# Patient Record
Sex: MALE | Race: WHITE | Employment: FULL TIME | URBAN - METROPOLITAN AREA
[De-identification: names, ages, dates, MRNs, and addresses within clinical notes are randomized per-mention and may not be internally consistent; named-entity substitution may affect disease eponyms.]

---

## 2017-05-15 ENCOUNTER — HOSPITAL ENCOUNTER (EMERGENCY)
Age: 27
Discharge: HOME | End: 2017-05-15
Attending: EMERGENCY MEDICINE
Payer: SELF-PAY

## 2017-05-15 VITALS
TEMPERATURE: 100.8 F | SYSTOLIC BLOOD PRESSURE: 145 MMHG | DIASTOLIC BLOOD PRESSURE: 90 MMHG | HEIGHT: 64 IN | RESPIRATION RATE: 19 BRPM | HEART RATE: 81 BPM | BODY MASS INDEX: 26.46 KG/M2 | WEIGHT: 155 LBS

## 2017-05-15 DIAGNOSIS — K08.89 PAIN, DENTAL: ICD-10-CM

## 2017-05-15 DIAGNOSIS — K02.9 TOOTH DECAY: Primary | ICD-10-CM

## 2017-05-15 PROCEDURE — 74011250637 HC RX REV CODE- 250/637: Performed by: PHYSICIAN ASSISTANT

## 2017-05-15 PROCEDURE — 99283 EMERGENCY DEPT VISIT LOW MDM: CPT

## 2017-05-15 RX ORDER — TRAMADOL HYDROCHLORIDE 50 MG/1
50 TABLET ORAL
Qty: 20 TAB | Refills: 0 | Status: SHIPPED | OUTPATIENT
Start: 2017-05-15 | End: 2017-08-15

## 2017-05-15 RX ORDER — IBUPROFEN 600 MG/1
600 TABLET ORAL
Status: COMPLETED | OUTPATIENT
Start: 2017-05-15 | End: 2017-05-15

## 2017-05-15 RX ORDER — PENICILLIN V POTASSIUM 250 MG/1
500 TABLET, FILM COATED ORAL
Status: COMPLETED | OUTPATIENT
Start: 2017-05-15 | End: 2017-05-15

## 2017-05-15 RX ORDER — PENICILLIN V POTASSIUM 500 MG/1
500 TABLET, FILM COATED ORAL 3 TIMES DAILY
Qty: 30 TAB | Refills: 0 | Status: SHIPPED | OUTPATIENT
Start: 2017-05-15 | End: 2017-05-25

## 2017-05-15 RX ORDER — NAPROXEN 375 MG/1
375 TABLET ORAL 2 TIMES DAILY WITH MEALS
Qty: 20 TAB | Refills: 0 | Status: SHIPPED | OUTPATIENT
Start: 2017-05-15 | End: 2017-09-25

## 2017-05-15 RX ADMIN — PENICILLIN V POTASIUM 500 MG: 250 TABLET ORAL at 20:49

## 2017-05-15 RX ADMIN — IBUPROFEN 600 MG: 600 TABLET, FILM COATED ORAL at 20:49

## 2017-05-16 NOTE — ED PROVIDER NOTES
HPI Comments: 27yoM to ED c/o right lower dental pain x 1 week. Taking advil and using ambesol w/o relief. States tooth cracked around the filling. Pain going into his ear. No fever at home. Denies dizziness, N/V or any other complaints. Patient is a 24 y.o. male presenting with dental problem. The history is provided by the patient. Dental Pain             No past medical history on file. Past Surgical History:   Procedure Laterality Date    HX WRIST FRACTURE TX           No family history on file. Social History     Social History    Marital status: SINGLE     Spouse name: N/A    Number of children: N/A    Years of education: N/A     Occupational History    Not on file. Social History Main Topics    Smoking status: Former Smoker    Smokeless tobacco: Current User      Comment: chews tobacco     Alcohol use Yes      Comment: occassionally    Drug use: Yes     Special: Marijuana    Sexual activity: Not Currently     Partners: Female     Birth control/ protection: Condom     Other Topics Concern    Not on file     Social History Narrative         ALLERGIES: Review of patient's allergies indicates no known allergies. Review of Systems   Constitutional: Negative for chills. HENT: Positive for dental problem and ear pain. Neurological: Positive for headaches. All other systems reviewed and are negative. Vitals:    05/15/17 2025   BP: 145/90   Pulse: 81   Resp: 19   Temp: (!) 100.8 °F (38.2 °C)   Weight: 70.3 kg (155 lb)   Height: 5' 4\" (1.626 m)            Physical Exam   Constitutional: He appears well-developed and well-nourished. No distress. HENT:   Head: Normocephalic and atraumatic. Right Ear: Hearing, tympanic membrane, external ear and ear canal normal.   Left Ear: Hearing, tympanic membrane, external ear and ear canal normal.   Nose: Nose normal.   Mouth/Throat: Uvula is midline. No oropharyngeal exudate, posterior oropharyngeal erythema or tonsillar abscesses. Eyes: Conjunctivae and EOM are normal. Pupils are equal, round, and reactive to light. Neck: Normal range of motion. Neck supple. Musculoskeletal: Normal range of motion. Lymphadenopathy:     He has no cervical adenopathy. Neurological: He is alert. Skin: Skin is warm and dry. He is not diaphoretic. Psychiatric: He has a normal mood and affect. MDM  Number of Diagnoses or Management Options  Pain, dental:   Tooth decay:   Diagnosis management comments: Pt with dental pain, decay to right lower molar around the filling. Tongue ring noted and pt advised to remove. Fever noted as well, perhaps from dental infection as pt has no other complaints. Will start on pcn, pain meds. F/u with dental clinic. Return here if worse.  NICOLLE Argueta 8:33 PM      Risk of Complications, Morbidity, and/or Mortality  Presenting problems: low  Diagnostic procedures: minimal  Management options: low    Patient Progress  Patient progress: improved    ED Course       Procedures

## 2017-05-16 NOTE — DISCHARGE INSTRUCTIONS
Tooth Decay: Care Instructions  Your Care Instructions    Tooth decay is damage to a tooth caused by plaque. Plaque is a thin film of bacteria that sticks to the teeth above and below the gum line. If plaque isn't removed from the teeth, it can build up and harden into tartar. The bacteria in plaque and tartar use sugars in food to make acids. These acids can cause tooth decay and gum disease. Any part of your tooth can decay, from the roots below the gum line to the chewing surface. Decay can affect the outer layer (enamel) or inner layer (dentin) of your teeth. The deeper the decay, the worse the damage. Untreated tooth decay will get worse and may lead to tooth loss. If you have a small hole (cavity) in your tooth, your dentist can repair it by removing the decay and filling the hole. If you have deeper decay, you may need more treatment. A very badly damaged tooth may have to be removed. Follow-up care is a key part of your treatment and safety. Be sure to make and go to all appointments, and call your dentist if you are having problems. It's also a good idea to know your test results and keep a list of the medicines you take. How can you care for yourself at home? If you have pain:  · Take an over-the-counter pain medicine, such as acetaminophen (Tylenol), ibuprofen (Advil, Motrin), or naproxen (Aleve). Be safe with medicines. Read and follow all instructions on the label. ¨ Do not take two or more pain medicines at the same time unless the doctor told you to. Many pain medicines have acetaminophen, which is Tylenol. Too much acetaminophen (Tylenol) can be harmful. · Put ice or a cold pack on your cheek over the tooth for 10 to 15 minutes at a time. Put a thin cloth between the ice and your skin. To prevent tooth decay  · Brush teeth twice a day, and floss once a day. Brushing with fluoride toothpaste and flossing may be enough to reverse early decay.   · Use a toothbrush with soft, rounded-end bristles and a head that is small enough to reach all parts of your teeth and mouth. Replace your toothbrush every 3 or 4 months. You may also use an electric toothbrush that has rotating and oscillating (back-and-forth) action. · Ask your dentist about having fluoride treatments at the dental office. · Brush your tongue to help get rid of bacteria. · Eat healthy foods that include whole grains, vegetables, and fruits. · Have your teeth cleaned by a professional at least two times a year. · Do not smoke or use smokeless tobacco. Tobacco can make tooth decay worse. When should you call for help? Call your dentist now or seek immediate medical care if:  · You have signs of infection, such as:  ¨ Increased pain, swelling, warmth, or redness. ¨ Red streaks on the gum leading from a tooth. ¨ Pus draining from the gum around a tooth. ¨ A fever. · You have a toothache. Watch closely for changes in your health, and be sure to contact your dentist if you have any problems. Where can you learn more? Go to http://justine-sahil.info/. Enter B215 in the search box to learn more about \"Tooth Decay: Care Instructions. \"  Current as of: August 9, 2016  Content Version: 11.2  © 1701-2669 [employer]. Care instructions adapted under license by [employer] (which disclaims liability or warranty for this information). If you have questions about a medical condition or this instruction, always ask your healthcare professional. [place] [address] any warranty or liability for your use of this information.

## 2017-08-15 ENCOUNTER — HOSPITAL ENCOUNTER (EMERGENCY)
Age: 27
Discharge: HOME | End: 2017-08-15
Attending: EMERGENCY MEDICINE
Payer: SELF-PAY

## 2017-08-15 VITALS
SYSTOLIC BLOOD PRESSURE: 118 MMHG | RESPIRATION RATE: 16 BRPM | HEART RATE: 65 BPM | OXYGEN SATURATION: 99 % | TEMPERATURE: 98.2 F | WEIGHT: 155 LBS | HEIGHT: 64 IN | BODY MASS INDEX: 26.46 KG/M2 | DIASTOLIC BLOOD PRESSURE: 80 MMHG

## 2017-08-15 DIAGNOSIS — K08.89 DENTALGIA: Primary | ICD-10-CM

## 2017-08-15 PROCEDURE — 99282 EMERGENCY DEPT VISIT SF MDM: CPT

## 2017-08-15 RX ORDER — PENICILLIN V POTASSIUM 500 MG/1
500 TABLET, FILM COATED ORAL 2 TIMES DAILY
Qty: 14 TAB | Refills: 0 | Status: SHIPPED | OUTPATIENT
Start: 2017-08-15 | End: 2017-08-22

## 2017-08-15 RX ORDER — TRAMADOL HYDROCHLORIDE 50 MG/1
50 TABLET ORAL
Qty: 15 TAB | Refills: 0 | Status: SHIPPED | OUTPATIENT
Start: 2017-08-15 | End: 2017-09-25

## 2017-08-16 NOTE — ED NOTES
Pt complains of lower right dental pain for the past 3 days, worsening today, upon inspection patient has a broken tooth in the bottom right side.  Patient admits that he chew tobacco

## 2017-08-16 NOTE — ED PROVIDER NOTES
HPI Comments: 24yo M c/o dental pain. He broke his tooth one week ago and pain has been worsening since then. The tooth is painful to touch and sensitive to temperature. He denies fevers, throat swelling, trouble swallowing. He feels like his cheek is swollen. He has an appt with a dentist next week. Patient is a 24 y.o. male presenting with dental problem. Dental Pain             History reviewed. No pertinent past medical history. Past Surgical History:   Procedure Laterality Date    HX WRIST FRACTURE TX           History reviewed. No pertinent family history. Social History     Social History    Marital status: SINGLE     Spouse name: N/A    Number of children: N/A    Years of education: N/A     Occupational History    Not on file. Social History Main Topics    Smoking status: Former Smoker    Smokeless tobacco: Current User      Comment: chews tobacco     Alcohol use Yes      Comment: occassionally    Drug use: Yes     Special: Marijuana    Sexual activity: Not Currently     Partners: Female     Birth control/ protection: Condom     Other Topics Concern    Not on file     Social History Narrative         ALLERGIES: Review of patient's allergies indicates no known allergies. Review of Systems   Constitutional: Negative for fever. HENT: Positive for dental problem and facial swelling. Eyes: Negative for visual disturbance. Respiratory: Negative for shortness of breath. Cardiovascular: Negative for chest pain. Gastrointestinal: Negative for abdominal pain. Genitourinary: Negative for dysuria. Musculoskeletal: Negative for neck pain. Skin: Negative for rash. Neurological: Negative for dizziness. Psychiatric/Behavioral: Negative for confusion. All other systems reviewed and are negative.       Vitals:    08/15/17 2223   BP: 118/80   Pulse: 65   Resp: 16   Temp: 98.2 °F (36.8 °C)   SpO2: 99%   Weight: 70.3 kg (155 lb)   Height: 5' 4\" (1.626 m) Physical Exam   Constitutional: He appears well-developed and well-nourished. No distress. HENT:   Head: Normocephalic and atraumatic. Broken decayed tooth bottom right. Multiple decayed teeth throughout. No gingival swelling or palpable fluctuance to suggest abscess. Airway patent without edema. Eyes: Conjunctivae are normal.   Neck: Normal range of motion. Neck supple. Cardiovascular: Normal rate, regular rhythm and normal heart sounds. Pulmonary/Chest: Effort normal and breath sounds normal.   Abdominal: Soft. Musculoskeletal: Normal range of motion. Neurological: He is alert. Skin: Skin is warm and dry. He is not diaphoretic. Psychiatric: He has a normal mood and affect. Nursing note and vitals reviewed. MDM  Number of Diagnoses or Management Options  Dentalgia: new and does not require workup  Diagnosis management comments: Dental pain with visible decay. No visible abscess. Cover with abx refer to dentist.   Discussed treatment plan, return precautions, symptomatic relief, and expected time to improvement. All questions answered. Patient is stable for discharge and outpatient management. ED Course       Procedures    Diagnosis:   1. Dentalgia          Disposition: home    Follow-up Information     Follow up With Details Comments [address] Schedule an appointment as soon as possible for a visit  Maykel Estevez [phone] [facility]  Immediately if symptoms worsen [address]  [phone]          Patient's Medications   Start Taking    PENICILLIN V POTASSIUM (VEETID) 500 MG TABLET    Take 1 Tab by mouth two (2) times a day for 7 days. TRAMADOL (ULTRAM) 50 MG TABLET    Take 1 Tab by mouth every six (6) hours as needed for Pain. Max Daily Amount: 200 mg. Continue Taking    NAPROXEN (NAPROSYN) 375 MG TABLET    Take 1 Tab by mouth two (2) times daily (with meals). These Medications have changed    No medications on file   Stop Taking    TRAMADOL (ULTRAM) 50 MG TABLET    Take 1 Tab by mouth every six (6) hours as needed for Pain. Max Daily Amount: 200 mg.      Percy Mancia PA-C

## 2017-08-16 NOTE — DISCHARGE INSTRUCTIONS
Complete entire course of antibiotics. Take NSAIDs such as tylenol or ibuprofin as directed for pain control. Do not take on an empty stomach. Narcotics cannot be taken while driving. Return to ER for severe pain, throat swelling, difficulty breathing, or high fevers. Follow up with dentist immediately. Tooth and Gum Pain: Care Instructions  Your Care Instructions    The most common causes of dental pain are tooth decay and gum disease. Pain can also be caused by an infection of the tooth (abscess) or the gums. Or you may have pain from a broken or cracked tooth. Other causes of pain include infection and damage to a tooth from nervous grinding of your teeth. A wisdom tooth can be painful when it is coming in but cannot break through the gum. It can also be painful when the tooth is only partway in and extra gum tissue has formed around it. The tissue can get inflamed (pericoronitis), and sometimes it gets infected. Prompt dental care can help find the cause of your toothache and keep the tooth from dying or gum disease from getting worse. Self-care at home may reduce your pain and discomfort. Follow-up care is a key part of your treatment and safety. Be sure to make and go to all appointments, and call your dentist or doctor if you are having problems. It's also a good idea to know your test results and keep a list of the medicines you take. How can you care for yourself at home? · To reduce pain and facial swelling, put an ice or cold pack on the outside of your cheek for 10 to 20 minutes at a time. Put a thin cloth between the ice and your skin. Do not use heat. · If your doctor prescribed antibiotics, take them as directed. Do not stop taking them just because you feel better. You need to take the full course of antibiotics. · Ask your doctor if you can take an over-the-counter pain medicine, such as acetaminophen (Tylenol), ibuprofen (Advil, Motrin), or naproxen (Aleve).  Be safe with medicines. Read and follow all instructions on the label. · Avoid very hot, cold, or sweet foods and drinks if they increase your pain. · Rinse your mouth with warm salt water every 2 hours to help relieve pain and swelling. Mix 1 teaspoon of salt in 8 ounces of water. · Talk to your dentist about using special toothpaste for sensitive teeth. To reduce pain on contact with heat or cold or when brushing, brush with this toothpaste regularly or rub a small amount of the paste on the sensitive area with a clean finger 2 or 3 times a day. Floss gently between your teeth. · Do not smoke or use spit tobacco. Tobacco use can make gum problems worse, decreases your ability to fight infection in your gums, and delays healing. If you need help quitting, talk to your doctor about stop-smoking programs and medicines. These can increase your chances of quitting for good. When should you call for help? Call 911 anytime you think you may need emergency care. For example, call if:  · You have trouble breathing. Call your dentist or doctor now or seek immediate medical care if:  · You have signs of infection, such as:  ¨ Increased pain, swelling, warmth, or redness. ¨ Red streaks leading from the area. ¨ Pus draining from the area. ¨ A fever. Watch closely for changes in your health, and be sure to contact your doctor if:  · You do not get better as expected. Where can you learn more? Go to http://justine-sahil.info/. Enter [id number] [id number] in the search box to learn more about \"Tooth and Gum Pain: Care Instructions. \"  Current as of: August 11, 2016  Content Version: 11.3  © 0301-4301 [employer]. Care instructions adapted under license by [employer] (which disclaims liability or warranty for this information).  If you have questions about a medical condition or this instruction, always ask your healthcare professional. [address]  any warranty or liability for your use of this information.

## 2017-09-25 ENCOUNTER — HOSPITAL ENCOUNTER (EMERGENCY)
Age: 27
Discharge: HOME | End: 2017-09-25
Attending: EMERGENCY MEDICINE
Payer: SELF-PAY

## 2017-09-25 VITALS
WEIGHT: 140 LBS | DIASTOLIC BLOOD PRESSURE: 75 MMHG | TEMPERATURE: 98.1 F | SYSTOLIC BLOOD PRESSURE: 117 MMHG | RESPIRATION RATE: 17 BRPM | HEART RATE: 58 BPM | OXYGEN SATURATION: 98 % | BODY MASS INDEX: 24.03 KG/M2

## 2017-09-25 DIAGNOSIS — K02.9 DENTAL DECAY: ICD-10-CM

## 2017-09-25 DIAGNOSIS — K08.89 PAIN, DENTAL: Primary | ICD-10-CM

## 2017-09-25 PROCEDURE — 99282 EMERGENCY DEPT VISIT SF MDM: CPT

## 2017-09-25 RX ORDER — NAPROXEN 375 MG/1
375 TABLET ORAL 2 TIMES DAILY WITH MEALS
Qty: 20 TAB | Refills: 0 | Status: SHIPPED | OUTPATIENT
Start: 2017-09-25 | End: 2020-06-29

## 2017-09-25 RX ORDER — PENICILLIN V POTASSIUM 500 MG/1
500 TABLET, FILM COATED ORAL 3 TIMES DAILY
Qty: 30 TAB | Refills: 0 | Status: SHIPPED | OUTPATIENT
Start: 2017-09-25 | End: 2017-10-05

## 2017-09-25 RX ORDER — TRAMADOL HYDROCHLORIDE 50 MG/1
50 TABLET ORAL
Qty: 15 TAB | Refills: 0 | Status: SHIPPED | OUTPATIENT
Start: 2017-09-25 | End: 2020-06-29

## 2017-09-25 NOTE — DISCHARGE INSTRUCTIONS
Tooth Decay: Care Instructions  Your Care Instructions    Tooth decay is damage to a tooth caused by plaque. Plaque is a thin film of bacteria that sticks to the teeth above and below the gum line. If plaque isn't removed from the teeth, it can build up and harden into tartar. The bacteria in plaque and tartar use sugars in food to make acids. These acids can cause tooth decay and gum disease. Any part of your tooth can decay, from the roots below the gum line to the chewing surface. Decay can affect the outer layer (enamel) or inner layer (dentin) of your teeth. The deeper the decay, the worse the damage. Untreated tooth decay will get worse and may lead to tooth loss. If you have a small hole (cavity) in your tooth, your dentist can repair it by removing the decay and filling the hole. If you have deeper decay, you may need more treatment. A very badly damaged tooth may have to be removed. Follow-up care is a key part of your treatment and safety. Be sure to make and go to all appointments, and call your dentist if you are having problems. It's also a good idea to know your test results and keep a list of the medicines you take. How can you care for yourself at home? If you have pain:  · Take an over-the-counter pain medicine, such as acetaminophen (Tylenol), ibuprofen (Advil, Motrin), or naproxen (Aleve). Be safe with medicines. Read and follow all instructions on the label. ¨ Do not take two or more pain medicines at the same time unless the doctor told you to. Many pain medicines have acetaminophen, which is Tylenol. Too much acetaminophen (Tylenol) can be harmful. · Put ice or a cold pack on your cheek over the tooth for 10 to 15 minutes at a time. Put a thin cloth between the ice and your skin. To prevent tooth decay  · Brush teeth twice a day, and floss once a day. Brushing with fluoride toothpaste and flossing may be enough to reverse early decay.   · Use a toothbrush with soft, rounded-end bristles and a head that is small enough to reach all parts of your teeth and mouth. Replace your toothbrush every 3 or 4 months. You may also use an electric toothbrush that has rotating and oscillating (back-and-forth) action. · Ask your dentist about having fluoride treatments at the dental office. · Brush your tongue to help get rid of bacteria. · Eat healthy foods that include whole grains, vegetables, and fruits. · Have your teeth cleaned by a professional at least two times a year. · Do not smoke or use smokeless tobacco. Tobacco can make tooth decay worse. When should you call for help? Call 911 anytime you think you may need emergency care. For example, call if:  · You have trouble breathing. Call your dentist now or seek immediate medical care if:  · You have new or worse symptoms of infection, such as:  ¨ Increased pain, swelling, warmth, or redness. ¨ Red streaks leading from the area. ¨ Pus draining from the area. ¨ A fever. Watch closely for changes in your health, and be sure to contact your doctor if:  · You do not get better as expected. Where can you learn more? Go to http://justine-sahil.info/. Enter C893 in the search box to learn more about \"Tooth Decay: Care Instructions. \"  Current as of: August 11, 2016  Content Version: 11.3  © 2345-7805 [employer]. Care instructions adapted under license by [employer] (which disclaims liability or warranty for this information). If you have questions about a medical condition or this instruction, always ask your healthcare professional. [place] [address] any warranty or liability for your use of this information.

## 2017-09-25 NOTE — ED PROVIDER NOTES
HPI Comments: 27yoM to ED c/o left lower dental pain x last night. Has had broken tooth \"for years. \" No fever, chills, ear pain HA or any other symptoms. Has not f/u with dental clinic as instructed from previous ED visits. Has not taken anything for pain. Patient is a 24 y.o. male presenting with dental problem. The history is provided by the patient. Dental Pain             No past medical history on file. Past Surgical History:   Procedure Laterality Date    HX WRIST FRACTURE TX           No family history on file. Social History     Social History    Marital status: SINGLE     Spouse name: N/A    Number of children: N/A    Years of education: N/A     Occupational History    Not on file. Social History Main Topics    Smoking status: Former Smoker    Smokeless tobacco: Current User      Comment: chews tobacco     Alcohol use Yes      Comment: occassionally    Drug use: Yes     Special: Marijuana    Sexual activity: Not Currently     Partners: Female     Birth control/ protection: Condom     Other Topics Concern    Not on file     Social History Narrative         ALLERGIES: Review of patient's allergies indicates no known allergies. Review of Systems   HENT: Positive for dental problem. Negative for facial swelling. All other systems reviewed and are negative. Vitals:    09/25/17 0511   BP: 117/75   Pulse: (!) 58   Resp: 17   Temp: 98.1 °F (36.7 °C)   SpO2: 98%   Weight: 63.5 kg (140 lb)            Physical Exam   Constitutional: He appears well-developed and well-nourished. No distress. HENT:   Head: Normocephalic and atraumatic. Right Ear: Hearing, tympanic membrane, external ear and ear canal normal.   Left Ear: Hearing, tympanic membrane, external ear and ear canal normal.   Nose: Nose normal.   Mouth/Throat: Uvula is midline, oropharynx is clear and moist and mucous membranes are normal. No trismus in the jaw. Skin: He is not diaphoretic.         MDM  Number of Diagnoses or Management Options  Dental decay:   Pain, dental:   Diagnosis management comments: Dental pain secondary to tooth decay. Once again advised pt to f/u with dental clinic . rx for abx and pain meds.  NICOLLE Ryder 5:17 AM      Risk of Complications, Morbidity, and/or Mortality  Presenting problems: low  Diagnostic procedures: minimal  Management options: low    Patient Progress  Patient progress: improved    ED Course       Procedures

## 2023-01-21 ENCOUNTER — HOSPITAL ENCOUNTER (EMERGENCY)
Age: 33
Discharge: HOME HEALTH | End: 2023-01-21
Attending: EMERGENCY MEDICINE

## 2023-01-21 VITALS
RESPIRATION RATE: 20 BRPM | DIASTOLIC BLOOD PRESSURE: 137 MMHG | SYSTOLIC BLOOD PRESSURE: 162 MMHG | OXYGEN SATURATION: 96 % | HEART RATE: 114 BPM | TEMPERATURE: 98.6 F

## 2023-01-21 DIAGNOSIS — B02.8 HERPES ZOSTER WITH OTHER COMPLICATION: Primary | ICD-10-CM

## 2023-01-21 PROCEDURE — 74011636637 HC RX REV CODE- 636/637: Performed by: EMERGENCY MEDICINE

## 2023-01-21 PROCEDURE — 74011250637 HC RX REV CODE- 250/637: Performed by: EMERGENCY MEDICINE

## 2023-01-21 PROCEDURE — 87255 GENET VIRUS ISOLATE HSV: CPT

## 2023-01-21 PROCEDURE — 99283 EMERGENCY DEPT VISIT LOW MDM: CPT

## 2023-01-21 RX ORDER — CHLORHEXIDINE GLUCONATE 1.2 MG/ML
15 RINSE ORAL EVERY 12 HOURS
Qty: 420 ML | Refills: 0 | Status: SHIPPED | OUTPATIENT
Start: 2023-01-21 | End: 2023-02-04

## 2023-01-21 RX ORDER — NAPROXEN 250 MG/1
500 TABLET ORAL
Status: COMPLETED | OUTPATIENT
Start: 2023-01-21 | End: 2023-01-21

## 2023-01-21 RX ORDER — PREDNISONE 20 MG/1
60 TABLET ORAL
Status: COMPLETED | OUTPATIENT
Start: 2023-01-21 | End: 2023-01-21

## 2023-01-21 RX ORDER — VALACYCLOVIR HYDROCHLORIDE 500 MG/1
1000 TABLET, FILM COATED ORAL
Status: COMPLETED | OUTPATIENT
Start: 2023-01-21 | End: 2023-01-21

## 2023-01-21 RX ORDER — VALACYCLOVIR HYDROCHLORIDE 1 G/1
1000 TABLET, FILM COATED ORAL 2 TIMES DAILY
Qty: 14 TABLET | Refills: 0 | Status: SHIPPED | OUTPATIENT
Start: 2023-01-21 | End: 2023-01-28

## 2023-01-21 RX ORDER — NAPROXEN 500 MG/1
500 TABLET ORAL 2 TIMES DAILY WITH MEALS
Qty: 20 TABLET | Refills: 0 | Status: SHIPPED | OUTPATIENT
Start: 2023-01-21 | End: 2023-01-31

## 2023-01-21 RX ORDER — PREDNISONE 50 MG/1
50 TABLET ORAL DAILY
Qty: 3 TABLET | Refills: 0 | Status: SHIPPED | OUTPATIENT
Start: 2023-01-21 | End: 2023-01-24

## 2023-01-21 RX ADMIN — VALACYCLOVIR HYDROCHLORIDE 1000 MG: 500 TABLET, FILM COATED ORAL at 08:58

## 2023-01-21 RX ADMIN — NAPROXEN 500 MG: 250 TABLET ORAL at 07:48

## 2023-01-21 RX ADMIN — PREDNISONE 60 MG: 20 TABLET ORAL at 07:48

## 2023-01-21 NOTE — ED PROVIDER NOTES
EMERGENCY DEPARTMENT HISTORY AND PHYSICAL EXAM    Date: 1/21/2023  Patient Name: Steve Yepez    History of Presenting Illness     Chief Complaint   Patient presents with    Mouth Lesions         History Provided By: Patient      Steve Yepez is a 29 y.o. male with a noncontributory past medical history who works as an . He presents to the emergency department with a complaint of rash on the right side of his face and inside his mouth and tongue that is been getting worse for the last 3 days and making it difficult to eat. The lesions in the mouth are more painful than the rash on the face. The rash on the face is limited to the right side and the right ear. He does describe some fever and general malaise. No nausea vomiting headache chest pain shortness of breath dysuria diarrhea or constipation    PCP: None    Current Facility-Administered Medications   Medication Dose Route Frequency Provider Last Rate Last Admin    valACYclovir (VALTREX) tablet 1,000 mg  1,000 mg Oral NOW Amalia Hebert MD        predniSONE (DELTASONE) tablet 60 mg  60 mg Oral NOW Amalia Hebert MD        naproxen (NAPROSYN) tablet 500 mg  500 mg Oral NOW Amalia Hebert MD         Current Outpatient Medications   Medication Sig Dispense Refill    valACYclovir (VALTREX) 1 gram tablet Take 1 Tablet by mouth two (2) times a day for 7 days. Indications: shingles 14 Tablet 0    predniSONE (DELTASONE) 50 mg tablet Take 1 Tablet by mouth daily for 3 days. 3 Tablet 0    naproxen (Naprosyn) 500 mg tablet Take 1 Tablet by mouth two (2) times daily (with meals) for 10 days. 20 Tablet 0    chlorhexidine (PERIDEX) 0.12 % solution 15 mL by Swish and Spit route every twelve (12) hours for 14 days. 420 mL 0       Past History        Past Medical History:  No past medical history on file.     Past Surgical History:  Past Surgical History:   Procedure Laterality Date    HX WRIST FRACTURE TX Family History:  No family history on file. Social History:  Social History     Tobacco Use    Smoking status: Former    Smokeless tobacco: Current    Tobacco comments:     chews tobacco    Substance Use Topics    Alcohol use: Yes     Comment: occassionally    Drug use: Yes     Types: Marijuana       Allergies:  No Known Allergies      Review of Systems   Review of Systems  Remainder of his review of systems is negative    Physical Exam     Vitals:    01/21/23 0333   BP: (!) 162/137   Pulse: (!) 114   Resp: 20   Temp: 98.6 °F (37 °C)   SpO2: 96%     Physical Exam    Nursing notes and vital signs reviewed     Constitutional: Non toxic appearing, does not appear to be in acute distress. Head: Normocephalic, Atraumatic, there is a patchy rash on the right ear and following the angle of the mandible down to the midline of the chin and right zygoma with a couple small vesicles noted 1 also on the tragus of the right ear. It appears like a dewdrop on a lorie petal.  Eyes: EOMI  Neck: Supple  Oropharyngeal: The mucous membranes are moist and pink. There does appear to be similarly denuded ulcerations on the right buccal mucosa and right lateral lingular surface they do not appear infected at this time. Cardiovascular: Mildly tachycardic with a sinus rhythm, no murmurs, rubs, or gallops  Chest: Normal work of breathing and chest excursion bilaterally  Lungs: Clear to ausculation bilaterally  Abdomen: Soft, non tender, non distended, normoactive bowel sounds    Skin: Warm and dry, normal cap refill, rash on face as described above  Neuro: Alert and appropriate, CN intact, normal speech, strength and sensation full and symmetric bilaterally, normal gait, normal coordination  Psychiatric: Normal mood and affect      Diagnostic Study Results     Labs -   No results found for this or any previous visit (from the past 12 hour(s)).     Radiologic Studies -none indicated  No orders to display     CT Results  (Last 48 hours)      None          CXR Results  (Last 48 hours)      None            Medications given in the ED-  Medications   valACYclovir (VALTREX) tablet 1,000 mg (has no administration in time range)   predniSONE (DELTASONE) tablet 60 mg (has no administration in time range)   naproxen (NAPROSYN) tablet 500 mg (has no administration in time range)         Medical Decision Making   I am the first provider for this patient. I reviewed the vital signs, available nursing notes, past medical history, past surgical history, family history and social history. Vital Signs-Reviewed the patient's vital signs. Pulse Oximetry Analysis - 96% on room air, not hypoxic     Cardiac Monitor:  Rate: 114 bpm  Rhythm: Normal sinus      Records Reviewed: Nursing Notes    Provider Notes (Medical Decision Making): Iliana Yepez is a 29 y.o. male who presents with a rash to the face and lesions of the mouth. I considered the following diagnoses that is not comprehensive to include zoster, herpes simplex, hand-foot-and-mouth, contact dermatitis, allergic reaction. His rash and lesions appear classic for herpes simplex virus or shingles. He will be treated with Valtrex by mouth, prednisone by mouth, Naprosyn by mouth, will be given a prescription for the same for continuation of therapy at home as well as chlorhexidine gluconate oral rinse to prevent secondary infection. The plan was explained to the patient who verbalized understanding and agreement with the plan. He may use over-the-counter nonsteroidals to control his fever. He was given close return precautions. Procedures:  Procedures    ED Course:   As above    Diagnosis and Disposition     Critical Care: Not indicated this encounter    DISCHARGE NOTE:    Iliana Yepez's  results have been reviewed with him. He has been counseled regarding his diagnosis, treatment, and plan.   He verbally conveys understanding and agreement of the signs, symptoms, diagnosis, treatment and prognosis and additionally agrees to follow up as discussed. He also agrees with the care-plan and conveys that all of his questions have been answered. I have also provided discharge instructions for him that include: educational information regarding their diagnosis and treatment, and list of reasons why they would want to return to the ED prior to their follow-up appointment, should his condition change. He has been provided with education for proper emergency department utilization. CLINICAL IMPRESSION:    1. Herpes zoster with other complication        PLAN:  1. D/C Home, follow-up with primary care physician as required or return to the emergency department with worsening symptoms. 2.   Current Discharge Medication List        START taking these medications    Details   valACYclovir (VALTREX) 1 gram tablet Take 1 Tablet by mouth two (2) times a day for 7 days. Indications: shingles  Qty: 14 Tablet, Refills: 0  Start date: 1/21/2023, End date: 1/28/2023      predniSONE (DELTASONE) 50 mg tablet Take 1 Tablet by mouth daily for 3 days. Qty: 3 Tablet, Refills: 0  Start date: 1/21/2023, End date: 1/24/2023      naproxen (Naprosyn) 500 mg tablet Take 1 Tablet by mouth two (2) times daily (with meals) for 10 days. Qty: 20 Tablet, Refills: 0  Start date: 1/21/2023, End date: 1/31/2023      chlorhexidine (PERIDEX) 0.12 % solution 15 mL by Swish and Spit route every twelve (12) hours for 14 days. Qty: 420 mL, Refills: 0  Start date: 1/21/2023, End date: 2/4/2023           3. Follow-up Information       Follow up With Specialties Details Why [address] [facility] EMERGENCY DEPT Emergency Medicine Go to  As needed, If symptoms worsen [address]  [phone]        Your doctor in 1 week if symptoms not improved.           _______________________________      Please note that this dictation was completed with [employer], the computer voice recognition software. Quite often unanticipated grammatical, syntax, homophones, and other interpretive errors are inadvertently transcribed by the computer software. Please disregard these errors. Please excuse any errors that have escaped final proofreading.

## 2023-01-21 NOTE — Clinical Note
[facility] EMERGENCY DEPT  [id number] [address] [phone]    Work/School Note    Date: 1/21/2023    To Whom It May concern:    Chinyere Yepez was seen and treated today in the emergency room by the following provider(s):  Attending Provider: Chang Love MD.      Chinyere Yepez is excused from work/school on 1/21/2023 through 1/23/2023. He is medically clear to return to work/school on 1/24/2023.          Sincerely,          Eduin Che RN

## 2023-01-21 NOTE — ED NOTES
This tech did bring the patient into the triage room to preform oral and facial swabs. Pt denies any contact with new products. Upon inspection of pt's mouth pt denies any sexual activity for the past year. Pt states that he had a fever and cough prior to the rash and sores showing up. Pt states that now he's experiencing hot flashes and states that they are extremely painful and it is difficult to eat and drink. Pt states that he chews tobacco and smokes marijuana but with the onset of the painful sores in his mouth he's unable to do so for any long period of time. Pt states poor oral hygiene.

## 2023-01-21 NOTE — ED NOTES
This tech did call lab and requested tubes for lab specimens, this tech will acquire samples from affected areas once received.

## 2023-01-21 NOTE — Clinical Note
[facility]  [address] [facility] EMERGENCY DEPT  [id number] [address] [phone]    Work/School Note    Date: 1/21/2023    To Whom It May concern:    Radha Yepez was seen and treated today in the emergency room by the following provider(s):  Attending Provider: Jeff Ponce MD.      Radha Yepez is excused from work/school on 1/21/2023 through 1/23/2023. He is medically clear to return to work/school on 1/24/2023.          Sincerely,          Radha Godinez MD

## 2023-01-21 NOTE — ED TRIAGE NOTES
Patient presents to the ED with complaints of lesions on R side of cheek and inside of mouth and tongue. States its very painful, and is unable to eat because it hurts. Started 3 days ago.